# Patient Record
Sex: MALE | Race: WHITE | HISPANIC OR LATINO
[De-identification: names, ages, dates, MRNs, and addresses within clinical notes are randomized per-mention and may not be internally consistent; named-entity substitution may affect disease eponyms.]

---

## 2017-03-16 ENCOUNTER — APPOINTMENT (OUTPATIENT)
Dept: OTOLARYNGOLOGY | Facility: CLINIC | Age: 42
End: 2017-03-16

## 2017-03-28 ENCOUNTER — APPOINTMENT (OUTPATIENT)
Dept: OTOLARYNGOLOGY | Facility: CLINIC | Age: 42
End: 2017-03-28

## 2017-03-29 ENCOUNTER — APPOINTMENT (OUTPATIENT)
Dept: OTOLARYNGOLOGY | Facility: CLINIC | Age: 42
End: 2017-03-29

## 2017-03-29 VITALS
DIASTOLIC BLOOD PRESSURE: 75 MMHG | HEART RATE: 95 BPM | TEMPERATURE: 98 F | OXYGEN SATURATION: 95 % | SYSTOLIC BLOOD PRESSURE: 111 MMHG

## 2017-05-04 ENCOUNTER — APPOINTMENT (OUTPATIENT)
Dept: OTOLARYNGOLOGY | Facility: CLINIC | Age: 42
End: 2017-05-04

## 2017-05-04 VITALS — HEIGHT: 71 IN | DIASTOLIC BLOOD PRESSURE: 86 MMHG | SYSTOLIC BLOOD PRESSURE: 132 MMHG

## 2017-05-04 DIAGNOSIS — J30.1 ALLERGIC RHINITIS DUE TO POLLEN: ICD-10-CM

## 2017-05-04 RX ORDER — METHYLPREDNISOLONE 4 MG/1
4 TABLET ORAL
Qty: 1 | Refills: 0 | Status: COMPLETED | COMMUNITY
Start: 2017-03-29 | End: 2017-04-04

## 2017-05-12 ENCOUNTER — APPOINTMENT (OUTPATIENT)
Dept: OTOLARYNGOLOGY | Facility: CLINIC | Age: 42
End: 2017-05-12

## 2017-05-12 VITALS
HEART RATE: 85 BPM | OXYGEN SATURATION: 94 % | SYSTOLIC BLOOD PRESSURE: 114 MMHG | TEMPERATURE: 98 F | DIASTOLIC BLOOD PRESSURE: 78 MMHG

## 2017-05-12 DIAGNOSIS — J30.1 ALLERGIC RHINITIS DUE TO POLLEN: ICD-10-CM

## 2017-06-28 ENCOUNTER — APPOINTMENT (OUTPATIENT)
Dept: OTOLARYNGOLOGY | Facility: CLINIC | Age: 42
End: 2017-06-28

## 2017-07-07 ENCOUNTER — APPOINTMENT (OUTPATIENT)
Dept: OTOLARYNGOLOGY | Facility: CLINIC | Age: 42
End: 2017-07-07

## 2017-07-07 VITALS
HEIGHT: 71 IN | HEART RATE: 69 BPM | BODY MASS INDEX: 31.5 KG/M2 | SYSTOLIC BLOOD PRESSURE: 118 MMHG | WEIGHT: 225 LBS | DIASTOLIC BLOOD PRESSURE: 81 MMHG

## 2017-07-07 PROBLEM — J30.1 SEASONAL ALLERGIC RHINITIS DUE TO POLLEN, UNSPECIFIED CHRONICITY: Status: ACTIVE | Noted: 2017-07-07

## 2017-07-07 RX ORDER — AMOXICILLIN 500 MG/1
500 TABLET, FILM COATED ORAL 3 TIMES DAILY
Qty: 30 | Refills: 0 | Status: DISCONTINUED | COMMUNITY
Start: 2017-05-04 | End: 2017-07-07

## 2017-07-07 RX ORDER — FLUTICASONE PROPIONATE 50 UG/1
50 SPRAY, METERED NASAL DAILY
Qty: 1 | Refills: 8 | Status: DISCONTINUED | COMMUNITY
Start: 2017-05-12 | End: 2017-07-07

## 2017-07-07 RX ORDER — AZELASTINE HYDROCHLORIDE 205.5 UG/1
0.15 SPRAY, METERED NASAL TWICE DAILY
Qty: 1 | Refills: 3 | Status: DISCONTINUED | COMMUNITY
Start: 2017-05-04 | End: 2017-07-07

## 2017-08-04 ENCOUNTER — LABORATORY RESULT (OUTPATIENT)
Age: 42
End: 2017-08-04

## 2017-08-04 ENCOUNTER — APPOINTMENT (OUTPATIENT)
Dept: OTOLARYNGOLOGY | Facility: CLINIC | Age: 42
End: 2017-08-04
Payer: MEDICAID

## 2017-08-04 VITALS
DIASTOLIC BLOOD PRESSURE: 76 MMHG | BODY MASS INDEX: 31.5 KG/M2 | WEIGHT: 225 LBS | HEIGHT: 71 IN | HEART RATE: 64 BPM | SYSTOLIC BLOOD PRESSURE: 121 MMHG

## 2017-08-04 PROCEDURE — 99214 OFFICE O/P EST MOD 30 MIN: CPT | Mod: 25

## 2017-08-04 PROCEDURE — 31575 DIAGNOSTIC LARYNGOSCOPY: CPT

## 2017-08-17 ENCOUNTER — APPOINTMENT (OUTPATIENT)
Dept: OTOLARYNGOLOGY | Facility: CLINIC | Age: 42
End: 2017-08-17

## 2017-09-07 ENCOUNTER — APPOINTMENT (OUTPATIENT)
Dept: OTOLARYNGOLOGY | Facility: CLINIC | Age: 42
End: 2017-09-07
Payer: MEDICAID

## 2017-09-07 DIAGNOSIS — R59.0 LOCALIZED ENLARGED LYMPH NODES: ICD-10-CM

## 2017-09-07 DIAGNOSIS — H92.01 OTALGIA, RIGHT EAR: ICD-10-CM

## 2017-09-07 DIAGNOSIS — R13.10 DYSPHAGIA, UNSPECIFIED: ICD-10-CM

## 2017-09-07 PROCEDURE — 99214 OFFICE O/P EST MOD 30 MIN: CPT | Mod: 25

## 2017-09-07 PROCEDURE — 31575 DIAGNOSTIC LARYNGOSCOPY: CPT

## 2017-09-08 ENCOUNTER — APPOINTMENT (OUTPATIENT)
Dept: OTOLARYNGOLOGY | Facility: CLINIC | Age: 42
End: 2017-09-08

## 2017-09-11 PROBLEM — H92.01 OTALGIA, RIGHT: Status: RESOLVED | Noted: 2017-02-12 | Resolved: 2017-09-11

## 2017-09-11 RX ORDER — AMOXICILLIN 500 MG/1
500 TABLET, FILM COATED ORAL 3 TIMES DAILY
Qty: 21 | Refills: 0 | Status: COMPLETED | COMMUNITY
Start: 2017-07-07 | End: 2017-07-18

## 2017-09-11 RX ORDER — AMOXICILLIN AND CLAVULANATE POTASSIUM 875; 125 MG/1; MG/1
875-125 TABLET, COATED ORAL TWICE DAILY
Qty: 42 | Refills: 0 | Status: COMPLETED | COMMUNITY
Start: 2017-08-04 | End: 2017-08-26

## 2017-09-11 RX ORDER — OMEPRAZOLE 40 MG/1
40 CAPSULE, DELAYED RELEASE ORAL
Qty: 30 | Refills: 3 | Status: COMPLETED | COMMUNITY
Start: 2017-07-07 | End: 2017-08-14

## 2017-09-15 ENCOUNTER — RX RENEWAL (OUTPATIENT)
Age: 42
End: 2017-09-15

## 2017-09-21 ENCOUNTER — APPOINTMENT (OUTPATIENT)
Dept: OTOLARYNGOLOGY | Facility: CLINIC | Age: 42
End: 2017-09-21

## 2017-10-25 ENCOUNTER — APPOINTMENT (OUTPATIENT)
Dept: OTOLARYNGOLOGY | Facility: CLINIC | Age: 42
End: 2017-10-25

## 2017-10-30 ENCOUNTER — APPOINTMENT (OUTPATIENT)
Dept: OTOLARYNGOLOGY | Facility: CLINIC | Age: 42
End: 2017-10-30

## 2017-12-12 ENCOUNTER — APPOINTMENT (OUTPATIENT)
Dept: OTOLARYNGOLOGY | Facility: CLINIC | Age: 42
End: 2017-12-12
Payer: MEDICAID

## 2017-12-12 VITALS
OXYGEN SATURATION: 96 % | TEMPERATURE: 98.2 F | HEART RATE: 83 BPM | SYSTOLIC BLOOD PRESSURE: 103 MMHG | DIASTOLIC BLOOD PRESSURE: 70 MMHG

## 2017-12-12 PROCEDURE — 99213 OFFICE O/P EST LOW 20 MIN: CPT | Mod: 25

## 2017-12-12 PROCEDURE — 31575 DIAGNOSTIC LARYNGOSCOPY: CPT

## 2017-12-12 RX ORDER — PANTOPRAZOLE 40 MG/1
40 TABLET, DELAYED RELEASE ORAL TWICE DAILY
Qty: 60 | Refills: 2 | Status: COMPLETED | COMMUNITY
Start: 2017-09-11 | End: 2017-12-12

## 2018-01-10 ENCOUNTER — APPOINTMENT (OUTPATIENT)
Dept: OTOLARYNGOLOGY | Facility: CLINIC | Age: 43
End: 2018-01-10
Payer: MEDICAID

## 2018-01-10 VITALS
BODY MASS INDEX: 22.9 KG/M2 | DIASTOLIC BLOOD PRESSURE: 72 MMHG | SYSTOLIC BLOOD PRESSURE: 117 MMHG | HEIGHT: 70 IN | HEART RATE: 64 BPM | WEIGHT: 160 LBS

## 2018-01-10 DIAGNOSIS — Z87.19 PERSONAL HISTORY OF OTHER DISEASES OF THE DIGESTIVE SYSTEM: ICD-10-CM

## 2018-01-10 DIAGNOSIS — R09.81 NASAL CONGESTION: ICD-10-CM

## 2018-01-10 DIAGNOSIS — Z87.898 PERSONAL HISTORY OF OTHER SPECIFIED CONDITIONS: ICD-10-CM

## 2018-01-10 DIAGNOSIS — M54.2 CERVICALGIA: ICD-10-CM

## 2018-01-10 DIAGNOSIS — J38.7 OTHER DISEASES OF LARYNX: ICD-10-CM

## 2018-01-10 DIAGNOSIS — J00 ACUTE NASOPHARYNGITIS [COMMON COLD]: ICD-10-CM

## 2018-01-10 PROCEDURE — 31575 DIAGNOSTIC LARYNGOSCOPY: CPT

## 2018-01-10 PROCEDURE — 99214 OFFICE O/P EST MOD 30 MIN: CPT | Mod: 25

## 2018-02-15 ENCOUNTER — APPOINTMENT (OUTPATIENT)
Dept: OTOLARYNGOLOGY | Facility: CLINIC | Age: 43
End: 2018-02-15
Payer: MEDICAID

## 2018-02-15 VITALS
WEIGHT: 160 LBS | HEIGHT: 70 IN | BODY MASS INDEX: 22.9 KG/M2 | SYSTOLIC BLOOD PRESSURE: 138 MMHG | DIASTOLIC BLOOD PRESSURE: 55 MMHG | HEART RATE: 75 BPM

## 2018-02-15 PROBLEM — R09.81 HEAD CONGESTION: Status: RESOLVED | Noted: 2017-03-29 | Resolved: 2018-02-15

## 2018-02-15 PROBLEM — Z87.898 HISTORY OF HEADACHE: Status: RESOLVED | Noted: 2017-12-12 | Resolved: 2018-02-15

## 2018-02-15 PROBLEM — J00 NASOPHARYNGITIS: Status: RESOLVED | Noted: 2017-03-29 | Resolved: 2018-02-15

## 2018-02-15 PROBLEM — J38.7 VALLECULAR CYST: Status: RESOLVED | Noted: 2017-03-29 | Resolved: 2018-02-15

## 2018-02-15 PROBLEM — M54.2 CERVICALGIA: Status: ACTIVE | Noted: 2017-09-07

## 2018-02-15 PROCEDURE — 99215 OFFICE O/P EST HI 40 MIN: CPT | Mod: 25

## 2018-02-15 PROCEDURE — 92511 NASOPHARYNGOSCOPY: CPT

## 2018-02-15 RX ORDER — CLINDAMYCIN HYDROCHLORIDE 300 MG/1
300 CAPSULE ORAL 3 TIMES DAILY
Qty: 30 | Refills: 0 | Status: COMPLETED | COMMUNITY
Start: 2018-01-10 | End: 2018-01-21

## 2018-02-15 RX ORDER — PREDNISONE 20 MG/1
20 TABLET ORAL DAILY
Qty: 5 | Refills: 0 | Status: COMPLETED | COMMUNITY
Start: 2018-01-10 | End: 2018-01-15

## 2018-04-12 ENCOUNTER — APPOINTMENT (OUTPATIENT)
Dept: OTOLARYNGOLOGY | Facility: CLINIC | Age: 43
End: 2018-04-12

## 2018-04-16 ENCOUNTER — APPOINTMENT (OUTPATIENT)
Dept: OTOLARYNGOLOGY | Facility: HOSPITAL | Age: 43
End: 2018-04-16

## 2018-05-14 ENCOUNTER — APPOINTMENT (OUTPATIENT)
Dept: OTOLARYNGOLOGY | Facility: HOSPITAL | Age: 43
End: 2018-05-14

## 2018-07-16 ENCOUNTER — APPOINTMENT (OUTPATIENT)
Dept: OTOLARYNGOLOGY | Facility: CLINIC | Age: 43
End: 2018-07-16

## 2018-07-18 ENCOUNTER — APPOINTMENT (OUTPATIENT)
Dept: OTOLARYNGOLOGY | Facility: CLINIC | Age: 43
End: 2018-07-18

## 2018-08-16 ENCOUNTER — APPOINTMENT (OUTPATIENT)
Dept: OTOLARYNGOLOGY | Facility: CLINIC | Age: 43
End: 2018-08-16
Payer: MEDICAID

## 2018-08-16 VITALS
BODY MASS INDEX: 24.34 KG/M2 | WEIGHT: 170 LBS | HEART RATE: 70 BPM | DIASTOLIC BLOOD PRESSURE: 89 MMHG | HEIGHT: 70 IN | SYSTOLIC BLOOD PRESSURE: 145 MMHG

## 2018-08-16 DIAGNOSIS — J35.1 HYPERTROPHY OF TONSILS: ICD-10-CM

## 2018-08-16 PROCEDURE — 31575 DIAGNOSTIC LARYNGOSCOPY: CPT

## 2018-08-16 PROCEDURE — 99214 OFFICE O/P EST MOD 30 MIN: CPT | Mod: 25

## 2018-09-26 ENCOUNTER — APPOINTMENT (OUTPATIENT)
Dept: OTOLARYNGOLOGY | Facility: CLINIC | Age: 43
End: 2018-09-26

## 2018-10-11 ENCOUNTER — APPOINTMENT (OUTPATIENT)
Dept: OTOLARYNGOLOGY | Facility: CLINIC | Age: 43
End: 2018-10-11

## 2018-10-25 ENCOUNTER — APPOINTMENT (OUTPATIENT)
Dept: OTOLARYNGOLOGY | Facility: CLINIC | Age: 43
End: 2018-10-25

## 2018-12-13 ENCOUNTER — APPOINTMENT (OUTPATIENT)
Dept: OTOLARYNGOLOGY | Facility: CLINIC | Age: 43
End: 2018-12-13
Payer: MEDICAID

## 2018-12-13 VITALS
DIASTOLIC BLOOD PRESSURE: 59 MMHG | WEIGHT: 222 LBS | HEART RATE: 68 BPM | SYSTOLIC BLOOD PRESSURE: 115 MMHG | HEIGHT: 70 IN | BODY MASS INDEX: 31.78 KG/M2

## 2018-12-13 DIAGNOSIS — H92.03 OTALGIA, BILATERAL: ICD-10-CM

## 2018-12-13 DIAGNOSIS — J35.3 HYPERTROPHY OF TONSILS WITH HYPERTROPHY OF ADENOIDS: ICD-10-CM

## 2018-12-13 DIAGNOSIS — K21.0 GASTRO-ESOPHAGEAL REFLUX DISEASE WITH ESOPHAGITIS: ICD-10-CM

## 2018-12-13 PROCEDURE — 99214 OFFICE O/P EST MOD 30 MIN: CPT | Mod: 25

## 2018-12-13 PROCEDURE — 31575 DIAGNOSTIC LARYNGOSCOPY: CPT

## 2018-12-13 RX ORDER — FAMOTIDINE 40 MG/1
40 TABLET, FILM COATED ORAL
Qty: 1 | Refills: 3 | Status: DISCONTINUED | COMMUNITY
Start: 2017-09-15 | End: 2018-12-13

## 2018-12-13 RX ORDER — OMEPRAZOLE 40 MG/1
40 CAPSULE, DELAYED RELEASE ORAL TWICE DAILY
Qty: 30 | Refills: 3 | Status: DISCONTINUED | COMMUNITY
Start: 2017-12-12 | End: 2018-12-13

## 2018-12-13 RX ORDER — FLUTICASONE PROPIONATE 50 UG/1
50 SPRAY, METERED NASAL DAILY
Qty: 1 | Refills: 5 | Status: COMPLETED | COMMUNITY
Start: 2018-08-16 | End: 2018-09-17

## 2018-12-13 RX ORDER — AMOXICILLIN AND CLAVULANATE POTASSIUM 875; 125 MG/1; MG/1
875-125 TABLET, COATED ORAL
Qty: 20 | Refills: 1 | Status: COMPLETED | COMMUNITY
Start: 2018-08-16 | End: 2018-08-30

## 2019-03-13 ENCOUNTER — APPOINTMENT (OUTPATIENT)
Dept: OTOLARYNGOLOGY | Facility: CLINIC | Age: 44
End: 2019-03-13
Payer: COMMERCIAL

## 2019-03-13 ENCOUNTER — LABORATORY RESULT (OUTPATIENT)
Age: 44
End: 2019-03-13

## 2019-03-13 VITALS
HEART RATE: 73 BPM | SYSTOLIC BLOOD PRESSURE: 125 MMHG | WEIGHT: 222 LBS | DIASTOLIC BLOOD PRESSURE: 71 MMHG | BODY MASS INDEX: 31.78 KG/M2 | HEIGHT: 70 IN

## 2019-03-13 DIAGNOSIS — Z87.09 PERSONAL HISTORY OF OTHER DISEASES OF THE RESPIRATORY SYSTEM: ICD-10-CM

## 2019-03-13 DIAGNOSIS — J35.01 CHRONIC TONSILLITIS: ICD-10-CM

## 2019-03-13 DIAGNOSIS — J01.81 OTHER ACUTE RECURRENT SINUSITIS: ICD-10-CM

## 2019-03-13 DIAGNOSIS — R07.0 PAIN IN THROAT: ICD-10-CM

## 2019-03-13 DIAGNOSIS — J34.3 HYPERTROPHY OF NASAL TURBINATES: ICD-10-CM

## 2019-03-13 DIAGNOSIS — J03.91 ACUTE RECURRENT TONSILLITIS, UNSPECIFIED: ICD-10-CM

## 2019-03-13 PROCEDURE — 99214 OFFICE O/P EST MOD 30 MIN: CPT | Mod: 25

## 2019-03-13 PROCEDURE — 31231 NASAL ENDOSCOPY DX: CPT

## 2019-03-13 RX ORDER — SODIUM CHLORIDE 0.65 %
0.65 AEROSOL, SPRAY (ML) NASAL
Qty: 1 | Refills: 3 | Status: COMPLETED | COMMUNITY
Start: 2018-12-13 | End: 2019-03-13

## 2019-04-15 NOTE — CONSULT LETTER
[Dear  ___] : Dear  [unfilled], [Courtesy Letter:] : I had the pleasure of seeing your patient, [unfilled], in my office today. [Consult Closing:] : Thank you very much for allowing me to participate in the care of this patient.  If you have any questions, please do not hesitate to contact me. [Sincerely,] : Sincerely, [FreeTextEntry3] : \par Susy Wright MD \par Otolaryngology, Head and Neck Surgery \par Residency site , [facility]  [FreeTextEntry1] : \par \par Enclosed please find my office notes for March 13, 2019. \par \par \par \par \par \par  [FreeTextEntry2] : Yosvany Portillo M.D. \par [facility] [address] \[place] [address]

## 2019-04-15 NOTE — PROCEDURE
[FreeTextEntry6] : NASAL ENDOSCOPY\par Indication:\par -Verbal consent was obtained from patient prior to exam. \par - Remigio-Synephrine and lidocaine 2% spray applied to nose bilaterally.\par Nasal endoscopy was performed with flexible     rigid  scope.\par Findings: \par -- Inferior turbinates  edematous   bilateral.  Inferior meatus clear bilateral.\par -- Septum was mildly  S-shaped.\par -- No polyps either side nose\par -- Middle and superior turbinates normal bilateral\par -- Middle meatus clear on right; with cloudy d/c and edema left.  SER clear bilateral.\par -- Nasopharynx without mass or exudate\par -- Adenoids were small in midline\par -- Eustachian orifices were clear bilateral\par \par The patient tolerated the procedure well.\par   [de-identified] : \par Indication:  dysphagia\par -Verbal consent was obtained from patient prior to procedure.\par -Remigio-Synephrine  spray applied to the nasal cavities.\par Flexible laryngoscopy was performed via left nostril and revealed the following:\par   -- Nasopharynx had small midline adenoids, no erythematous or exudate\par   -- Base of tongue was symmetric and not enlarged.\par   -- Vallecula was clear.  Lingual tonsils not enlarged.  \par   -- Cobblestoning posterior pharyngeal wall. \par   -- Epiglottis, both aryepiglottic folds and both false vocal folds were normal\par   -- Arytenoids both with mild edema and erythema \par   -- True vocal folds were fully mobile and without lesions. \par   -- Post cricoid area was edematous.\par   -- Interarytenoid edema was  present.\par \par The patient tolerated the procedure well.\par \par

## 2019-04-15 NOTE — ASSESSMENT
[FreeTextEntry1] : Mr. Garcia was evaluated for the following:\par \par 1.) dysphagia due to pharyngeal edema and inflammation\par 2.) recurrent acute tonsillitis \par 3.) acute sinusitis, left\par 4.) GERD asymptomatic, on no meds\par \par PLAN:\par -- Augmentin x 10 days\par -- prednisone 20 mg x 3 days\par -- restart fluticasone nasal spray\par -- Reflux precautions\par -- check throat culture and notify pt\par -- recommend T&A for recurrent tonsilloadenoiditis.\par \par Return 1 month

## 2019-04-15 NOTE — HISTORY OF PRESENT ILLNESS
[de-identified] : Mr. WHITE is still complaining of throat pain and choking when swallowing that got worse 2-3 weeks ago. \miguel Has been treated with antibiotics several times for adenoiditis and pharyngeal inflammation. \par Sometimes symptoms seem to improve, but then return. \par He feels like his throat is closing up, mucus gets stuck in his throat and is worse at night. \miguel Also is experiencing frontal and maxillary facial pressure today.  No fever.  Has had a cold. \par Does not feel postnasal drip or nasal congestion. \par Has SOB with exertion over past few months; has appointment with pulmonologist. \miguel Denies feeling heartburn lately; stopped taking famotidine and omeprazole a few months ago. \par \par No major changes to medical history since last visit in Dec 2018.\par

## 2019-04-15 NOTE — PHYSICAL EXAM
[Midline] : trachea located in midline position [Nasal Endoscopy Performed] : nasal endoscopy was performed, see procedure section for findings [Laryngoscopy Performed] : laryngoscopy was performed, see procedure section for findings [Normal] : no masses and lesions seen, face is symmetric [FreeTextEntry1] : no hoarseness. [de-identified] : Mild edema inferior turbinate  [de-identified] : 1-2+ erythema and exudate.  Culture taken

## 2019-04-17 ENCOUNTER — APPOINTMENT (OUTPATIENT)
Dept: OTOLARYNGOLOGY | Facility: CLINIC | Age: 44
End: 2019-04-17

## 2019-06-12 ENCOUNTER — APPOINTMENT (OUTPATIENT)
Dept: OTOLARYNGOLOGY | Facility: CLINIC | Age: 44
End: 2019-06-12

## 2020-01-15 ENCOUNTER — APPOINTMENT (OUTPATIENT)
Dept: OTOLARYNGOLOGY | Facility: CLINIC | Age: 45
End: 2020-01-15
Payer: COMMERCIAL

## 2020-01-15 VITALS
HEIGHT: 70 IN | WEIGHT: 223 LBS | DIASTOLIC BLOOD PRESSURE: 80 MMHG | SYSTOLIC BLOOD PRESSURE: 129 MMHG | HEART RATE: 69 BPM | BODY MASS INDEX: 31.92 KG/M2

## 2020-01-15 DIAGNOSIS — G47.33 OBSTRUCTIVE SLEEP APNEA (ADULT) (PEDIATRIC): ICD-10-CM

## 2020-01-15 DIAGNOSIS — Z87.891 PERSONAL HISTORY OF NICOTINE DEPENDENCE: ICD-10-CM

## 2020-01-15 DIAGNOSIS — R51 HEADACHE: ICD-10-CM

## 2020-01-15 DIAGNOSIS — R06.02 SHORTNESS OF BREATH: ICD-10-CM

## 2020-01-15 DIAGNOSIS — R13.14 DYSPHAGIA, PHARYNGOESOPHAGEAL PHASE: ICD-10-CM

## 2020-01-15 DIAGNOSIS — Z87.898 PERSONAL HISTORY OF OTHER SPECIFIED CONDITIONS: ICD-10-CM

## 2020-01-15 DIAGNOSIS — Z87.09 PERSONAL HISTORY OF OTHER DISEASES OF THE RESPIRATORY SYSTEM: ICD-10-CM

## 2020-01-15 DIAGNOSIS — H66.91 OTITIS MEDIA, UNSPECIFIED, RIGHT EAR: ICD-10-CM

## 2020-01-15 PROCEDURE — 31575 DIAGNOSTIC LARYNGOSCOPY: CPT

## 2020-01-15 PROCEDURE — 99214 OFFICE O/P EST MOD 30 MIN: CPT | Mod: 25

## 2020-01-15 RX ORDER — PREDNISONE 20 MG/1
20 TABLET ORAL DAILY
Qty: 3 | Refills: 0 | Status: COMPLETED | COMMUNITY
Start: 2019-03-13 | End: 2020-01-15

## 2020-01-15 RX ORDER — FLUTICASONE PROPIONATE 50 UG/1
50 SPRAY, METERED NASAL DAILY
Qty: 1 | Refills: 3 | Status: COMPLETED | COMMUNITY
Start: 2019-03-13 | End: 2020-01-15

## 2020-01-15 RX ORDER — AMOXICILLIN AND CLAVULANATE POTASSIUM 875; 125 MG/1; MG/1
875-125 TABLET, COATED ORAL
Qty: 28 | Refills: 0 | Status: COMPLETED | COMMUNITY
Start: 2019-03-13 | End: 2020-01-15

## 2020-01-15 NOTE — HISTORY OF PRESENT ILLNESS
[de-identified] : Over past few months, feels like throat going to close up  when sleeping.  Occ nighttime awakenings.\par Snoring in sleep.  has been told he sometimes chokes or stops breathing in sleep.\par Gained 4 lbs.\par Has SOB with just walking up stairs.  Going to see pulmonologist dr. Espinoza today ([facility])\par \par Also chokes when swallowing liquids > solids over past few months.\par Minimal throat pain.\par

## 2020-01-15 NOTE — ASSESSMENT
[FreeTextEntry1] :                  was evaluated for the following issues today:\par \par 1.) snoring and choking in sleep c/w PAMELA\par 2.) dysphagia to solid/liquids - no obstructive seen on laryngosocpy\par 3.) throat pain minimal now\par \par PLAN:\par -- MBS\par -- sleep study\par \par Return after above\par

## 2020-01-15 NOTE — PROCEDURE
[de-identified] : \par Indication:  dysphagia\par -Verbal consent was obtained from patient prior to procedure.\par -Remigio-Synephrine spray applied to the nasal cavities.\par Flexible laryngoscopy was performed via left nostril and revealed the following:\par   -- Nasopharynx had no mass.  thin white mucus\par   -- Base of tongue was symmetric and not enlarged. Lingual tonsils symmetric and not enlarged.\par   -- Vallecula was clear.  Mild cobblestoning posterior pharyngeal wall and thin white mucus.\par   -- Epiglottis, both aryepiglottic folds and both false vocal folds were normal\par   -- Arytenoids both with mild edema and no erythema \par   -- True vocal folds were fully mobile and without lesions. \par   -- Post cricoid area was clear.\par   -- Interarytenoid edema was  present.\par \par The patient tolerated the procedure well.\par

## 2020-01-15 NOTE — PHYSICAL EXAM
[Midline] : trachea located in midline position [Laryngoscopy Performed] : laryngoscopy was performed, see procedure section for findings [Normal] : no neck adenopathy [de-identified] : Mild inferior turbinate hypertrophy  [de-identified] : thin, white in posterior nose and NP [de-identified] : Mallampati 3 airway [de-identified] : 1-2+ bilateral

## 2020-01-24 ENCOUNTER — APPOINTMENT (OUTPATIENT)
Dept: RADIOLOGY | Facility: HOSPITAL | Age: 45
End: 2020-01-24

## 2020-02-04 ENCOUNTER — APPOINTMENT (OUTPATIENT)
Dept: SLEEP CENTER | Facility: HOSPITAL | Age: 45
End: 2020-02-04

## 2021-02-10 ENCOUNTER — APPOINTMENT (OUTPATIENT)
Dept: OTOLARYNGOLOGY | Facility: CLINIC | Age: 46
End: 2021-02-10

## 2021-06-16 ENCOUNTER — APPOINTMENT (OUTPATIENT)
Dept: OTOLARYNGOLOGY | Facility: CLINIC | Age: 46
End: 2021-06-16

## 2021-07-14 ENCOUNTER — APPOINTMENT (OUTPATIENT)
Dept: OTOLARYNGOLOGY | Facility: CLINIC | Age: 46
End: 2021-07-14

## 2022-09-13 ENCOUNTER — APPOINTMENT (OUTPATIENT)
Dept: OTOLARYNGOLOGY | Facility: CLINIC | Age: 47
End: 2022-09-13

## 2023-02-03 ENCOUNTER — APPOINTMENT (OUTPATIENT)
Dept: OTOLARYNGOLOGY | Facility: CLINIC | Age: 48
End: 2023-02-03

## 2023-09-22 ENCOUNTER — APPOINTMENT (OUTPATIENT)
Dept: OTOLARYNGOLOGY | Facility: CLINIC | Age: 48
End: 2023-09-22

## 2025-01-09 ENCOUNTER — APPOINTMENT (OUTPATIENT)
Dept: OTOLARYNGOLOGY | Facility: CLINIC | Age: 50
End: 2025-01-09

## 2025-03-20 ENCOUNTER — APPOINTMENT (OUTPATIENT)
Dept: OTOLARYNGOLOGY | Facility: CLINIC | Age: 50
End: 2025-03-20
Payer: COMMERCIAL

## 2025-03-20 ENCOUNTER — NON-APPOINTMENT (OUTPATIENT)
Age: 50
End: 2025-03-20

## 2025-03-20 VITALS
DIASTOLIC BLOOD PRESSURE: 86 MMHG | HEIGHT: 71 IN | TEMPERATURE: 97.6 F | SYSTOLIC BLOOD PRESSURE: 137 MMHG | HEART RATE: 72 BPM | BODY MASS INDEX: 33.4 KG/M2 | WEIGHT: 238.6 LBS

## 2025-03-20 DIAGNOSIS — G47.33 OBSTRUCTIVE SLEEP APNEA (ADULT) (PEDIATRIC): ICD-10-CM

## 2025-03-20 DIAGNOSIS — R06.02 SHORTNESS OF BREATH: ICD-10-CM

## 2025-03-20 DIAGNOSIS — J35.3 HYPERTROPHY OF TONSILS WITH HYPERTROPHY OF ADENOIDS: ICD-10-CM

## 2025-03-20 DIAGNOSIS — J34.3 HYPERTROPHY OF NASAL TURBINATES: ICD-10-CM

## 2025-03-20 DIAGNOSIS — F12.90 CANNABIS USE, UNSPECIFIED, UNCOMPLICATED: ICD-10-CM

## 2025-03-20 DIAGNOSIS — Z86.69 PERSONAL HISTORY OF OTHER DISEASES OF THE NERVOUS SYSTEM AND SENSE ORGANS: ICD-10-CM

## 2025-03-20 DIAGNOSIS — J01.81 OTHER ACUTE RECURRENT SINUSITIS: ICD-10-CM

## 2025-03-20 DIAGNOSIS — J03.91 ACUTE RECURRENT TONSILLITIS, UNSPECIFIED: ICD-10-CM

## 2025-03-20 DIAGNOSIS — R13.14 DYSPHAGIA, PHARYNGOESOPHAGEAL PHASE: ICD-10-CM

## 2025-03-20 DIAGNOSIS — J30.1 ALLERGIC RHINITIS DUE TO POLLEN: ICD-10-CM

## 2025-03-20 DIAGNOSIS — R07.0 PAIN IN THROAT: ICD-10-CM

## 2025-03-20 DIAGNOSIS — R13.10 DYSPHAGIA, UNSPECIFIED: ICD-10-CM

## 2025-03-20 DIAGNOSIS — J35.01 CHRONIC TONSILLITIS: ICD-10-CM

## 2025-03-20 PROCEDURE — 31575 DIAGNOSTIC LARYNGOSCOPY: CPT

## 2025-03-20 PROCEDURE — 99204 OFFICE O/P NEW MOD 45 MIN: CPT | Mod: 25

## 2025-03-20 RX ORDER — FLUTICASONE PROPIONATE 50 UG/1
50 SPRAY, METERED NASAL DAILY
Qty: 1 | Refills: 5 | Status: ACTIVE | COMMUNITY
Start: 2025-03-20 | End: 1900-01-01

## 2025-03-21 ENCOUNTER — TRANSCRIPTION ENCOUNTER (OUTPATIENT)
Age: 50
End: 2025-03-21

## 2025-03-21 PROBLEM — J35.01 CHRONIC TONSILLITIS: Status: RESOLVED | Noted: 2017-08-04 | Resolved: 2025-03-21

## 2025-03-21 PROBLEM — J30.1 ALLERGIC RHINITIS DUE TO POLLEN: Status: ACTIVE | Noted: 2017-05-04

## 2025-04-08 ENCOUNTER — APPOINTMENT (OUTPATIENT)
Facility: CLINIC | Age: 50
End: 2025-04-08

## 2025-06-12 ENCOUNTER — APPOINTMENT (OUTPATIENT)
Dept: SLEEP CENTER | Facility: HOME HEALTH | Age: 50
End: 2025-06-12

## 2025-08-08 ENCOUNTER — APPOINTMENT (OUTPATIENT)
Dept: OTOLARYNGOLOGY | Facility: CLINIC | Age: 50
End: 2025-08-08
Payer: COMMERCIAL

## 2025-08-08 VITALS — WEIGHT: 220 LBS | HEIGHT: 71 IN | BODY MASS INDEX: 30.8 KG/M2

## 2025-08-08 DIAGNOSIS — G47.30 SLEEP APNEA, UNSPECIFIED: ICD-10-CM

## 2025-08-08 DIAGNOSIS — R06.83 SNORING: ICD-10-CM

## 2025-08-08 DIAGNOSIS — R09.81 NASAL CONGESTION: ICD-10-CM

## 2025-08-08 DIAGNOSIS — J31.0 CHRONIC RHINITIS: ICD-10-CM

## 2025-08-08 PROCEDURE — 31231 NASAL ENDOSCOPY DX: CPT

## 2025-08-08 PROCEDURE — 99214 OFFICE O/P EST MOD 30 MIN: CPT | Mod: 25

## 2025-08-08 RX ORDER — FLUTICASONE PROPIONATE 50 UG/1
50 SPRAY NASAL DAILY
Qty: 1 | Refills: 2 | Status: ACTIVE | COMMUNITY
Start: 2025-08-08 | End: 1900-01-01

## 2025-08-08 RX ORDER — AZELASTINE 137 UG/1
137 SPRAY, METERED NASAL DAILY
Qty: 1 | Refills: 2 | Status: ACTIVE | COMMUNITY
Start: 2025-08-08 | End: 1900-01-01

## 2025-09-10 ENCOUNTER — APPOINTMENT (OUTPATIENT)
Dept: SLEEP CENTER | Facility: HOME HEALTH | Age: 50
End: 2025-09-10